# Patient Record
Sex: MALE | URBAN - METROPOLITAN AREA
[De-identification: names, ages, dates, MRNs, and addresses within clinical notes are randomized per-mention and may not be internally consistent; named-entity substitution may affect disease eponyms.]

---

## 2020-08-03 ENCOUNTER — EMERGENCY (EMERGENCY)
Facility: HOSPITAL | Age: 40
LOS: 1 days | Discharge: LEFT BEFORE DISCHARGE | End: 2020-08-03
Admitting: EMERGENCY MEDICINE
Payer: SELF-PAY

## 2020-08-03 VITALS
DIASTOLIC BLOOD PRESSURE: 92 MMHG | HEART RATE: 65 BPM | TEMPERATURE: 98 F | OXYGEN SATURATION: 100 % | SYSTOLIC BLOOD PRESSURE: 134 MMHG | RESPIRATION RATE: 18 BRPM

## 2020-08-03 PROCEDURE — L9991: CPT

## 2020-08-03 NOTE — ED ADULT TRIAGE NOTE - CHIEF COMPLAINT QUOTE
Pt comes in for c/o RUQ pain that began about 2-3hrs ago, reports hx of gallbladder removal in the past. Pt appears in no acute distress, vs as noted